# Patient Record
Sex: FEMALE | Race: WHITE | NOT HISPANIC OR LATINO | URBAN - METROPOLITAN AREA
[De-identification: names, ages, dates, MRNs, and addresses within clinical notes are randomized per-mention and may not be internally consistent; named-entity substitution may affect disease eponyms.]

---

## 2018-03-20 ENCOUNTER — OUTPATIENT (OUTPATIENT)
Dept: OUTPATIENT SERVICES | Facility: HOSPITAL | Age: 68
LOS: 1 days | End: 2018-03-20

## 2018-03-20 ENCOUNTER — APPOINTMENT (OUTPATIENT)
Dept: OPHTHALMOLOGY | Facility: CLINIC | Age: 68
End: 2018-03-20

## 2018-03-23 DIAGNOSIS — H18.609 KERATOCONUS, UNSPECIFIED, UNSPECIFIED EYE: ICD-10-CM

## 2018-03-23 PROBLEM — Z00.00 ENCOUNTER FOR PREVENTIVE HEALTH EXAMINATION: Noted: 2018-03-23

## 2018-03-27 ENCOUNTER — APPOINTMENT (OUTPATIENT)
Dept: SURGERY | Facility: CLINIC | Age: 68
End: 2018-03-27

## 2018-04-09 ENCOUNTER — OUTPATIENT (OUTPATIENT)
Dept: OUTPATIENT SERVICES | Facility: HOSPITAL | Age: 68
LOS: 1 days | Discharge: HOME | End: 2018-04-09

## 2018-05-14 ENCOUNTER — OUTPATIENT (OUTPATIENT)
Dept: OUTPATIENT SERVICES | Facility: HOSPITAL | Age: 68
LOS: 1 days | Discharge: HOME | End: 2018-05-14

## 2018-06-13 ENCOUNTER — RESULT REVIEW (OUTPATIENT)
Age: 68
End: 2018-06-13

## 2018-07-30 ENCOUNTER — APPOINTMENT (OUTPATIENT)
Dept: HEART AND VASCULAR | Facility: CLINIC | Age: 68
End: 2018-07-30

## 2020-11-19 ENCOUNTER — APPOINTMENT (OUTPATIENT)
Dept: ORTHOPEDIC SURGERY | Facility: CLINIC | Age: 70
End: 2020-11-19
Payer: MEDICARE

## 2020-11-19 VITALS — HEIGHT: 62 IN | BODY MASS INDEX: 23.92 KG/M2 | WEIGHT: 130 LBS

## 2020-11-19 DIAGNOSIS — M21.40 FLAT FOOT [PES PLANUS] (ACQUIRED), UNSPECIFIED FOOT: ICD-10-CM

## 2020-11-19 DIAGNOSIS — M25.879 OTHER SPECIFIED JOINT DISORDERS, UNSPECIFIED ANKLE AND FOOT: ICD-10-CM

## 2020-11-19 DIAGNOSIS — M17.10 UNILATERAL PRIMARY OSTEOARTHRITIS, UNSPECIFIED KNEE: ICD-10-CM

## 2020-11-19 PROCEDURE — 99204 OFFICE O/P NEW MOD 45 MIN: CPT

## 2020-11-19 PROCEDURE — 73562 X-RAY EXAM OF KNEE 3: CPT | Mod: LT

## 2020-11-19 PROCEDURE — 73610 X-RAY EXAM OF ANKLE: CPT | Mod: LT

## 2020-11-19 NOTE — HISTORY OF PRESENT ILLNESS
[de-identified] : Location: Left ankle, right knee\par Duration: August 2020 (ankle), 2 days (right knee)\par Context: atraumatic (ankle), felt knee buckle \par Quality: achy\par Aggravating factors: walking too much\par Associated symptoms: swelling (knee)\par Conservative treatment: rest, orthotics\par Prior studies: ankle x-rays from podiatrist August 2020

## 2020-11-19 NOTE — ASSESSMENT
[FreeTextEntry1] : Discussed at length with patient exam history and imaging.  Patient is artery wearing a reach orthotic which I feel is suitable for her condition in regards to her ankle. Discussed at length with patient definitive treatment for ankle pain would be a major hindfoot and midfoot reconstruction at this point the patient does not feel her symptoms warrant any type of surgical intervention.  Reviewed at length treatment options regards to her right knee and patient elects home exercises at this time.   Patient to follow up in regards to knee and persistent discomfort and if she like to proceed with surgery for her ankle I would refer her to a foot and ankle specialist who does this type of procedure

## 2020-11-19 NOTE — PHYSICAL EXAM
[de-identified] : Right knee\par \par Constitutional: \par The patient is healthy-appearing and in no apparent distress. \par \par Gait:\par The patient ambulates with a normal gait and no limp.\par \par Cardiovascular System: \par The capillary refill is less than 2 seconds. \par \par Skin: \par There are no skin abnormalities.\par \par Right Knee: \par \par Bony Palpation: \par There is no tenderness of the medial joint line. \par There is no tenderness of the lateral joint line.\par There is no tenderness of the medial femoral chondyle.\par There is no tenderness of the lateral femoral chondyle.\par There is no tenderness of the tibial tubercle.\par There is no tenderness of the superior patella.\par There is no tenderness of the inferior patella.\par There is tenderness of the medial patellar facet.\par There is tenderness of the lateral patellar facet.\par \par Soft Tissue Palpation: \par There is no tenderness of the medial retinaculum.\par There is no tenderness of the lateral retinaculum.\par There is no tenderness of the quadriceps tendon.\par There is no tenderness of the patella tendon.\par There is no tenderness of the ITB.\par There is no tenderness of the pes anserine.\par \par Active Range of Motion: \par The range of motion at the knee actively and passively is full. \par \par Special Tests: \par There is a negative Apley.\par There is a negative Steinmanns. \par There is a negative Lachman and Anterior Drawer.\par There is a negative Posterior Drawer.  \par There is no varus or valgus laxity.\par \par Strength: \par There is 4/5 hip flexion and 5/5 knee flexion and extension.  \par \par Psychiatric: \par The patient demonstrates a normal mood and affect and is active and alert,\par Left ankle:\par \par Cardiovascular System: \par Ther capillary refill is less than 2 seconds. \par \par Skin: \par There are no skin abnormalities of ankle.\par \par Ankles and Feet: \par Inspection: \par There is no erythema.\par There is no induration.\par There is no warmth.\par There is severe pes planus and rigid heel valgus with lateral positioning of the calcaneus.  \par There is mild swelling (anterolateral). \par \par Bony Palpation: \par There is no tenderness of the calcaneal tuberosity.\par There is no tenderness of the metatarsals.\par There is no tenderness of the tarsometatarsal joints\par There is no tenderness of the navicular tuberosity. \par There is no tenderness of the dome of talus.\par There is no tenderness of the head of talus.\par There is no tenderness of the inferior tibiofibular joint.\par \par Soft Tissue Palpation: \par There is no tenderness of the tibialis posterior.\par There is no tenderness of the tibialis anterior. \par There is no tenderness of the plantar fascia.\par There is no tenderness of the Achilles tendon.\par There is no tenderness of the extensor hallucis longus.\par There is no tenderness of the sinus tarsi. \par There is no tenderness of the peroneus longus and brevis.\par There is no tenderness of the deltoid ligament.   \par There is tenderness of the anterior talofibular ligament and the calcaneofibular ligament. \par \par Active Range of Motion: \par The range of motion at the ankle is full. \par \par Stability: \par The anterior drawer is negative. \par \par Strength: \par There is 5/5 ankle plantarflexion and dorsiflexion.\par \par Neurological System: \par There is normal sensation to light touch at the ankle and foot.  [de-identified] : X-ray left ankle.  There is severe pes planus with a talo-navicular dorsal arthritis with spurring.  \par \par X-ray right knee.  There is moderate patellofemoral arthritis

## 2021-01-21 ENCOUNTER — APPOINTMENT (OUTPATIENT)
Dept: ENDOCRINOLOGY | Facility: CLINIC | Age: 71
End: 2021-01-21
Payer: MEDICARE

## 2021-01-21 ENCOUNTER — LABORATORY RESULT (OUTPATIENT)
Age: 71
End: 2021-01-21

## 2021-01-21 VITALS
BODY MASS INDEX: 23.37 KG/M2 | SYSTOLIC BLOOD PRESSURE: 179 MMHG | WEIGHT: 127 LBS | DIASTOLIC BLOOD PRESSURE: 93 MMHG | HEIGHT: 62 IN | HEART RATE: 87 BPM

## 2021-01-21 DIAGNOSIS — E78.5 HYPERLIPIDEMIA, UNSPECIFIED: ICD-10-CM

## 2021-01-21 DIAGNOSIS — Z86.018 PERSONAL HISTORY OF OTHER BENIGN NEOPLASM: ICD-10-CM

## 2021-01-21 DIAGNOSIS — Z87.891 PERSONAL HISTORY OF NICOTINE DEPENDENCE: ICD-10-CM

## 2021-01-21 PROCEDURE — 99205 OFFICE O/P NEW HI 60 MIN: CPT | Mod: 25

## 2021-01-21 PROCEDURE — 36415 COLL VENOUS BLD VENIPUNCTURE: CPT

## 2021-01-21 RX ORDER — ROSUVASTATIN CALCIUM 5 MG/1
TABLET, FILM COATED ORAL
Refills: 0 | Status: ACTIVE | COMMUNITY

## 2021-01-21 RX ORDER — ALENDRONATE SODIUM 70 MG/1
70 TABLET ORAL
Refills: 0 | Status: ACTIVE | COMMUNITY

## 2021-01-21 RX ORDER — BUTALBITAL, ACETAMINOPHEN, AND CAFFEINE 50; 300; 40 MG/1; MG/1; MG/1
CAPSULE ORAL
Refills: 0 | Status: ACTIVE | COMMUNITY

## 2021-01-21 RX ORDER — CALCIUM CITRATE/VITAMIN D3 315MG-6.25
TABLET ORAL
Refills: 0 | Status: ACTIVE | COMMUNITY

## 2021-01-22 LAB
25(OH)D3 SERPL-MCNC: 51.2 NG/ML
ALBUMIN SERPL ELPH-MCNC: 4.4 G/DL
ALP BLD-CCNC: 53 U/L
ALT SERPL-CCNC: 10 U/L
ANION GAP SERPL CALC-SCNC: 12 MMOL/L
AST SERPL-CCNC: 14 U/L
BILIRUB SERPL-MCNC: 0.9 MG/DL
BUN SERPL-MCNC: 19 MG/DL
CALCIUM SERPL-MCNC: 9.1 MG/DL
CALCIUM SERPL-MCNC: 9.1 MG/DL
CHLORIDE SERPL-SCNC: 106 MMOL/L
CO2 SERPL-SCNC: 23 MMOL/L
CREAT SERPL-MCNC: 0.84 MG/DL
DHEA-S SERPL-MCNC: 19.2 UG/DL
GLUCOSE SERPL-MCNC: 85 MG/DL
IRON SATN MFR SERPL: 43 %
IRON SERPL-MCNC: 117 UG/DL
MAGNESIUM SERPL-MCNC: 2.3 MG/DL
PARATHYROID HORMONE INTACT: 44 PG/ML
PHOSPHATE SERPL-MCNC: 3.6 MG/DL
POTASSIUM SERPL-SCNC: 4.1 MMOL/L
PROT SERPL-MCNC: 6.7 G/DL
SODIUM SERPL-SCNC: 141 MMOL/L
T4 FREE SERPL-MCNC: 1.2 NG/DL
TESTOST SERPL-MCNC: 82.9 NG/DL
TIBC SERPL-MCNC: 272 UG/DL
TSH SERPL-ACNC: 1.85 UIU/ML
UIBC SERPL-MCNC: 155 UG/DL

## 2021-01-25 ENCOUNTER — OUTPATIENT (OUTPATIENT)
Dept: OUTPATIENT SERVICES | Facility: HOSPITAL | Age: 71
LOS: 1 days | End: 2021-01-25

## 2021-01-25 ENCOUNTER — APPOINTMENT (OUTPATIENT)
Dept: RADIOLOGY | Facility: CLINIC | Age: 71
End: 2021-01-25
Payer: MEDICARE

## 2021-01-25 LAB
TTG IGG SER IA-ACNC: 2.7 U/ML
TTG IGG SER IA-ACNC: NEGATIVE

## 2021-01-26 LAB
ALBUMIN MFR SERPL ELPH: 59.3 %
ALBUMIN SERPL-MCNC: 4.1 G/DL
ALBUMIN/GLOB SERPL: 1.5 RATIO
ALPHA1 GLOB MFR SERPL ELPH: 4.3 %
ALPHA1 GLOB SERPL ELPH-MCNC: 0.3 G/DL
ALPHA2 GLOB MFR SERPL ELPH: 8.9 %
ALPHA2 GLOB SERPL ELPH-MCNC: 0.6 G/DL
B-GLOBULIN MFR SERPL ELPH: 11.7 %
B-GLOBULIN SERPL ELPH-MCNC: 0.8 G/DL
GAMMA GLOB FLD ELPH-MCNC: 1.1 G/DL
GAMMA GLOB MFR SERPL ELPH: 15.8 %
INTERPRETATION SERPL IEP-IMP: NORMAL
PROT SERPL-MCNC: 6.9 G/DL
PROT SERPL-MCNC: 6.9 G/DL

## 2021-01-27 PROCEDURE — 77085 DXA BONE DENSITY AXL VRT FX: CPT | Mod: 26

## 2021-02-01 LAB
TTG IGA SER IA-ACNC: 1.2 U/ML
TTG IGA SER-ACNC: NEGATIVE

## 2021-02-17 LAB
ALBUMIN SERPL ELPH-MCNC: 4.7 G/DL
ALP BLD-CCNC: 57 U/L
ALT SERPL-CCNC: 9 U/L
ANION GAP SERPL CALC-SCNC: 22 MMOL/L
AST SERPL-CCNC: 16 U/L
BILIRUB SERPL-MCNC: 1.1 MG/DL
BUN SERPL-MCNC: 32 MG/DL
CALCIUM SERPL-MCNC: 9.5 MG/DL
CHLORIDE SERPL-SCNC: 102 MMOL/L
CO2 SERPL-SCNC: 14 MMOL/L
CREAT SERPL-MCNC: 1.07 MG/DL
GLUCOSE SERPL-MCNC: 83 MG/DL
IGF-1 INTERP: NORMAL
IGF-I BLD-MCNC: 77 NG/ML
MAGNESIUM SERPL-MCNC: 2.1 MG/DL
POTASSIUM SERPL-SCNC: 4.6 MMOL/L
PROT SERPL-MCNC: 7.4 G/DL
SODIUM SERPL-SCNC: 138 MMOL/L

## 2021-02-17 NOTE — ADDENDUM
[FreeTextEntry1] : Recent laboratory and radiology results as below; discussed with Ms. Cabrera. Calciotropic panel within range. Testosterone elevated but below 150 ng/dL. No hirsutism, voice changes, or other virilizing symptoms. Menstrual periods were regular prior to menopause. Bone density as below, within the osteopenic range at all sites. Other test results within range. We will discontinue alendronate for now, with monitoring of bone density in one year. Ms. Cabrera clarified she started alendronate in October 2019 and had a remote history of use 20 years ago. She is amenable to a trial of spironolactone for hair loss. We discussed the risks and benefits, including but not limited to hypotension, electrolyte disturbances. We will start spironolactone 50 mg twice daily for now, with plan to repeat a calciotropic panel in two weeks and check IGF-1 for elevated testosterone. We will plan for a follow-up appointment in six months. 2/01/21\par \par Recent laboratory results as below. Blood urea nitrogen and creatinine elevated consistent with dehydration. Potassium and magnesium within range. IGF-1 within range. I left a message for Ms. Cabrera to discuss. 2/1721

## 2021-02-17 NOTE — HISTORY OF PRESENT ILLNESS
[FreeTextEntry1] : Ms. Cabrera is a 70 year-old woman with a history of osteoporosis presenting to establish care with me for osteoporosis and hair loss.\par \par Bone History\par Menopause: Age 50\par Osteoporosis diagnosed in 2017 on routine bone density (no report available); most recent bone density as below\par Fracture history: Stress fracture in her left ankle in 2015\par Family history: Mother with history of osteoporosis; no parental history of hip fracture\par Treatment: Alendronate 70 mg weekly from 2019 to present (does not remember month started)\par \par Falls: None recent\par Height loss: About 2 inches\par Kidney stones: No\par Dental health: Regular appointments, no history of implants, no upcoming procedures planned\par Exercise: Walking at least 30 minutes most days\par Dairy intake: Rare\par Calcium supplements: Citracal 630 mg twice daily\par Multivitamin: None\par Vitamin D supplements: in calcium supplement\par \par Osteoporosis risk factors include: Postmenopausal status,  race, prior fracture, falls, height loss, small thin bones, tobacco use, excessive alcohol, anorexia, family history, vitamin D deficiency, corticosteroid use, seizure medications, malabsorption, hyperparathyroidism, hyperthyroidism.\par NEGATIVE EXCEPT: Postmenopausal status,  race, family history\par \par She has a history of hair loss that is bothersome to her. She has a history of headaches and burning in her left foot; discussed with her other treating providers.

## 2021-02-17 NOTE — ASSESSMENT
[FreeTextEntry1] : Osteoporosis. She has no history of fragility fracture. She has a history of alendronate therapy from 2019 to present. We discussed completion of a metabolic evaluation for secondary causes of bone loss. Her most recent bone density demonstrates osteopenia at the spine and hip sites. We discussed the potential benefits and risks of antiresorptive osteoporosis therapy at length, including but not limited to osteonecrosis of the jaw and atypical femoral fracture. She is tolerating alendronate and we can continue pending interval bone density testing; if her bone density remains in the osteopenia range we can consider a "drug holiday" from antiresorptive therapy.\par Metabolic evaluation for secondary causes of osteoporosis\par Continue alendronate 70 mg weekly pending interval bone density testing\par Calcium 1200 mg daily from diet and supplements (to be taken in divided doses as no more than 500-600 mg can be absorbed at one time); continue current regimen\par Continue current vitamin D regimen pending level\par Diet, exercise and fall prevention discussed\par Physical therapy for balance and bone health\par \par Hair loss. We will perform laboratory evaluation as below. We discussed that first line therapy for hair loss is topical minoxidil. We discussed the limited data for biotin, Nutrafol, and Viviscal supplements. \par \par I reviewed the DXA performed on June 18, 2019 with the patient today.\par I reviewed the laboratories performed in 2020 with the patient today. \par I counseled the patient regarding calcium and vitamin D intake today.\par I discussed the following osteoporosis therapies: Alendronate

## 2021-07-29 ENCOUNTER — APPOINTMENT (OUTPATIENT)
Dept: ENDOCRINOLOGY | Facility: CLINIC | Age: 71
End: 2021-07-29
Payer: MEDICARE

## 2021-07-29 VITALS
SYSTOLIC BLOOD PRESSURE: 120 MMHG | DIASTOLIC BLOOD PRESSURE: 72 MMHG | WEIGHT: 121 LBS | HEART RATE: 85 BPM | BODY MASS INDEX: 22.13 KG/M2

## 2021-07-29 DIAGNOSIS — M85.80 OTHER SPECIFIED DISORDERS OF BONE DENSITY AND STRUCTURE, UNSPECIFIED SITE: ICD-10-CM

## 2021-07-29 DIAGNOSIS — L65.9 NONSCARRING HAIR LOSS, UNSPECIFIED: ICD-10-CM

## 2021-07-29 DIAGNOSIS — R79.89 OTHER SPECIFIED ABNORMAL FINDINGS OF BLOOD CHEMISTRY: ICD-10-CM

## 2021-07-29 PROCEDURE — 99214 OFFICE O/P EST MOD 30 MIN: CPT

## 2021-07-30 NOTE — HISTORY OF PRESENT ILLNESS
[FreeTextEntry1] : Ms. Cabrera is a 70 year-old woman presenting for follow-up of osteopenia and hair loss. I saw her for an initial visit in January 2021.\par \par Bone History\par Menopause: Age 50\par Osteoporosis diagnosed in 2017 on routine bone density (no report available); most recent bone density as below\par Fracture history: Stress fracture in her left ankle in 2015\par Family history: Mother with history of osteoporosis; no parental history of hip fracture\par Treatment: Alendronate 70 mg weekly from October 2019 to February 2021; remote history \par \par Falls:Fall onto a boardwalk\par Height loss: About 2 inches\par Kidney stones: No\par Dental health: Regular appointments, no history of implants, no upcoming procedures planned\par Exercise: Walking at least 30 minutes most days\par Dairy intake: Rare\par Calcium supplements: Citracal 630 mg twice daily\par Multivitamin: None\par Vitamin D supplements: in calcium supplement\par \par Osteoporosis risk factors include: Postmenopausal status,  race, prior fracture, falls, height loss, small thin bones, tobacco use, excessive alcohol, anorexia, family history, vitamin D deficiency, corticosteroid use, seizure medications, malabsorption, hyperparathyroidism, hyperthyroidism.\par NEGATIVE EXCEPT: Postmenopausal status,  race, family history\par \par Hair loss. Elevated testosterone. \par She has a history of hair loss that is bothersome to her. \par Laboratory evaluation at her initial visit was significant for testosterone 82.9 ng/dL (normal: 2.9-40.8).\par We started spironolactone 50 mg twice daily in February 2021. \par \par Interim History \par Laboratory and radiology results from last visit as below. Calciotropic panel within range. Testosterone elevated but below 150 ng/dL. No hirsutism, voice changes, or other virilizing symptoms. Menstrual periods were regular prior to menopause. Other test results within range. \par Bone density as below, within the osteopenic range at all sites and with low FRAX score. We discontinued alendronate.  \par We started spironolactone 50 mg twice daily in February 2021. She is taking Nutrafol supplements. Potassium and magnesium within range. She has noted good improvement, although some continued hair loss. \par She has completed physical therapy for balance and bone health.\par Medical and surgical history, medications, allergies, social and family history reviewed and updated as needed.

## 2021-07-30 NOTE — PHYSICAL EXAM
[Alert] : alert [Healthy Appearance] : healthy appearance [No Acute Distress] : no acute distress [Normal Sclera/Conjunctiva] : normal sclera/conjunctiva [Normal Hearing] : hearing was normal [No Respiratory Distress] : no respiratory distress [No Stigmata of Cushings Syndrome] : no stigmata of Cushings Syndrome [Normal Gait] : normal gait [Normal Insight/Judgement] : insight and judgment were intact [Kyphosis] : no kyphosis present [Acanthosis Nigricans] : no acanthosis nigricans [de-identified] : no moon facies, no supraclavicular fat pads

## 2021-07-30 NOTE — DATA REVIEWED
[FreeTextEntry1] : Laboratories (November 4, 220) reviewed and significant for: \par Unremarkable complete blood count\par \par Laboratories (September 14, 2020) reviewed and significant for: \par Calcium 9.2 mg/dL (albumin 4.1 g/dL)\par BUN/creatinine 21/0.70 mg/dL \par Alkaline phosphatase 65 U/L\par \par Most recent bone mineral density\par Date: June 18, 2019\par Source: [facility]\par Site: [facility]\par \par Site	BMD (g/cm2)	T-score	Change previous	Change baseline	\par Lumbar spine	0.887	-2.4\par Femoral neck	0.797	-1.7	\par Total hip	                0.856       -1.2         \par Distal radius           	                \par DXA Comments: L4 excluded; left hip sites

## 2021-07-30 NOTE — ASSESSMENT
[FreeTextEntry1] : Osteopenia. She has no history of fragility fracture. She has a history of alendronate therapy from October 2019 to February 2021. Metabolic evaluation for secondary causes of bone loss previously unremarkable. Her most recent bone density demonstrated osteopenia at the spine and hip sites. Her 10 year fracture risk calculated by FRAX was 9.7% for major osteoporotic fracture and 1.5% for hip fracture, below the treatment thresholds.\par Interval bone density testing in January 2023\par Calcium 1200 mg daily from diet and supplements (to be taken in divided doses as no more than 500-600 mg can be absorbed at one time); continue current regimen\par Continue current vitamin D regimen\par Diet, exercise and fall prevention discussed\par \par Hair loss. She has a history of hair loss that is bothersome to her. Testosterone elevated but below 150 ng/dL. She has had some improvement with spironolactone and we will adjust as below. I advised she can continue Nutrafol supplements. \par Adjust spironolactone to 100 mg twice daily; monitor electrolytes and testosterone two weeks after dose adjustment\par \par Return to see me in 6 months. \par \par I reviewed the DXA performed on January 27, 2021 with the patient today.\par I reviewed the laboratories performed from 2020 to present with the patient today. \par I counseled the patient regarding calcium and vitamin D intake today.\par \par CC:\par Dr. Kyung Muro, Fax [phone]; patient results only test results be sent

## 2021-08-05 ENCOUNTER — RX RENEWAL (OUTPATIENT)
Age: 71
End: 2021-08-05

## 2021-08-05 RX ORDER — SPIRONOLACTONE 100 MG/1
100 TABLET ORAL TWICE DAILY
Qty: 180 | Refills: 1 | Status: ACTIVE | COMMUNITY
Start: 2021-02-01 | End: 1900-01-01

## 2021-10-04 ENCOUNTER — LABORATORY RESULT (OUTPATIENT)
Age: 71
End: 2021-10-04

## 2021-10-04 ENCOUNTER — APPOINTMENT (OUTPATIENT)
Dept: THORACIC SURGERY | Facility: CLINIC | Age: 71
End: 2021-10-04
Payer: MEDICARE

## 2021-10-04 DIAGNOSIS — R91.1 SOLITARY PULMONARY NODULE: ICD-10-CM

## 2021-10-04 PROCEDURE — 99203 OFFICE O/P NEW LOW 30 MIN: CPT

## 2021-10-04 NOTE — PHYSICAL EXAM
[General Appearance - Alert] : alert [General Appearance - In No Acute Distress] : in no acute distress [General Appearance - Well Nourished] : well nourished [Neck Appearance] : the appearance of the neck was normal [Neck Cervical Mass (___cm)] : no neck mass was observed [] : no respiratory distress [Respiration, Rhythm And Depth] : normal respiratory rhythm and effort [Exaggerated Use Of Accessory Muscles For Inspiration] : no accessory muscle use [Examination Of The Chest] : the chest was normal in appearance [Abnormal Walk] : normal gait [Nail Clubbing] : no clubbing  or cyanosis of the fingernails [Musculoskeletal - Swelling] : no joint swelling seen [Oriented To Time, Place, And Person] : oriented to person, place, and time [Impaired Insight] : insight and judgment were intact [Affect] : the affect was normal

## 2021-10-06 ENCOUNTER — APPOINTMENT (OUTPATIENT)
Dept: CT IMAGING | Facility: HOSPITAL | Age: 71
End: 2021-10-06

## 2021-10-06 ENCOUNTER — OUTPATIENT (OUTPATIENT)
Dept: OUTPATIENT SERVICES | Facility: HOSPITAL | Age: 71
LOS: 1 days | End: 2021-10-06
Payer: SELF-PAY

## 2021-10-06 PROCEDURE — 75571 CT HRT W/O DYE W/CA TEST: CPT

## 2021-10-06 PROCEDURE — 75571 CT HRT W/O DYE W/CA TEST: CPT | Mod: 26

## 2021-10-15 ENCOUNTER — OUTPATIENT (OUTPATIENT)
Dept: OUTPATIENT SERVICES | Facility: HOSPITAL | Age: 71
LOS: 1 days | End: 2021-10-15
Payer: MEDICARE

## 2021-10-15 ENCOUNTER — RESULT REVIEW (OUTPATIENT)
Age: 71
End: 2021-10-15

## 2021-10-15 ENCOUNTER — APPOINTMENT (OUTPATIENT)
Dept: CT IMAGING | Facility: HOSPITAL | Age: 71
End: 2021-10-15
Payer: MEDICARE

## 2021-10-15 PROCEDURE — 71260 CT THORAX DX C+: CPT

## 2021-10-15 PROCEDURE — 71260 CT THORAX DX C+: CPT | Mod: 26,MH

## 2021-10-15 NOTE — HISTORY OF PRESENT ILLNESS
[FreeTextEntry1] : 71 year old female, former smoker(quit 1982), with a PMHx GERD, HLD?, with recent CT chest revealing 2.2 cm partially calcified lesion in the anterior mediastinum and a stable 4 mm right upper lobe ground glass nodule. She presents today for an initial evaluation. She is referred by Dr. 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

## 2021-10-15 NOTE — ASSESSMENT
[FreeTextEntry1] : 71 year old female, former smoker(quit 1982), with a PMHx GERD, HLD?, with recent CT chest revealing 2.2 cm partially calcified lesion in the anterior mediastinum and a stable 4 mm right upper lobe ground glass nodule. She presents today for an initial evaluation. She is referred by Dr. Son Puga.\par \par Today the patient reports feeling well. She denies pain, fever, hemoptysis and weight loss. 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 I don’t recommend biopsy of this area because it is a high risk for seeding. Will order thymoma panel for further evaluation. If all results are negative, we can be almost certain that this growth is related to the thymic gland. The treatment for a thymoma is resection. Explained that thymomas are slow growing and the only way to determine a definitive diagnose is to remove it. Will have her RTC after above to discuss surveillance vs. resection.\par \par PLAN:\par 1. Thymoma panel\par 2. CT chest with contrast\par 3. RTC\par I reviewed the documentation recorded by the scribe in my presence and it accurately and completely records my words and actions\par \par

## 2021-10-15 NOTE — END OF VISIT
[FreeTextEntry3] : I, GERMAIN MOSQUEDA , am scribing for and in the presence of TAE BARKER the following sections: history of present illness, past medical/family/surgical/family/social history, review of systems, vital signs, physical exam, and disposition.\par \par

## 2021-10-21 ENCOUNTER — APPOINTMENT (OUTPATIENT)
Dept: THORACIC SURGERY | Facility: CLINIC | Age: 71
End: 2021-10-21
Payer: MEDICARE

## 2021-10-21 VITALS
WEIGHT: 125 LBS | BODY MASS INDEX: 23 KG/M2 | HEART RATE: 72 BPM | RESPIRATION RATE: 17 BRPM | HEIGHT: 62 IN | TEMPERATURE: 96.8 F | OXYGEN SATURATION: 95 % | SYSTOLIC BLOOD PRESSURE: 125 MMHG | DIASTOLIC BLOOD PRESSURE: 65 MMHG

## 2021-10-21 PROCEDURE — 99213 OFFICE O/P EST LOW 20 MIN: CPT

## 2021-10-26 NOTE — ASSESSMENT
[FreeTextEntry1] : 71 year old female, former smoker(quit 1982), with a PMHx GERD, HLD, with recent CT chest revealing 2.2 cm partially calcified lesion in the anterior mediastinum and a stable 4 mm right upper lobe ground glass nodule. She presents today to review CT chest. She is referred by Dr. Son Puga.\par \par CT chest completed on 10/15/21 was reviewed and the anterior mediastinal mass was assessed with differential diagnosis including a calcified lymph node, thymic cyst or neoplasm such as thymoma. I explained that the anterior mediastinal mass blood work was negative for evidence of lymphoma or a germ cell tumor. I am recommending further evaluation with an MRI chest to characterize the abnormality. If the MRI is not definitive in determining a diagnosis then surgical resection will be recommended. I explained that if this an early thymoma then surgical resection is the recommendation. \par \par Will plan to have the patient RTC after the MRI chest to discuss the results and possible surgical resection. \par \par Plan:\par 1. MRI chest with and without contrast then RTC \par \par I, NOEMI SAUL , am scribing for and in the presence of [Dr. Travon Saucedo] the following sections: History of present illness, past Medical/family/surgical/family/social history, review of systems, vital signs, physical exam and disposition.\par \par Travon SHERMAN, have reviewed the documentation recorded by the scribe in my presence and it accurately and completely records my words and actions.\par

## 2021-10-26 NOTE — HISTORY OF PRESENT ILLNESS
[FreeTextEntry1] : 71 year old female, former smoker(quit 1982), with a PMHx GERD, HLD?, with recent CT chest revealing 2.2 cm partially calcified lesion in the anterior mediastinum and a stable 4 mm right upper lobe ground glass nodule. She presents today to review CT chest. She is referred by Dr. 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  Heterogeneously calcified anterior mediastinal mass, indeterminate and differential diagnosis include calcified lymph node, thymic cyst, neoplasm. Correlation with MRI may be of benefit to better evaluate enhancement.\par 2.  Scarring in the right upper lobe. Scattered solid nodules measuring up to 5 mm, no routine follow-up is required.\par

## 2021-11-01 ENCOUNTER — APPOINTMENT (OUTPATIENT)
Dept: MRI IMAGING | Facility: HOSPITAL | Age: 71
End: 2021-11-01
Payer: MEDICARE

## 2021-11-01 ENCOUNTER — OUTPATIENT (OUTPATIENT)
Dept: OUTPATIENT SERVICES | Facility: HOSPITAL | Age: 71
LOS: 1 days | End: 2021-11-01
Payer: MEDICARE

## 2021-11-01 ENCOUNTER — RESULT REVIEW (OUTPATIENT)
Age: 71
End: 2021-11-01

## 2021-11-01 PROCEDURE — 71552 MRI CHEST W/O & W/DYE: CPT | Mod: MG

## 2021-11-01 PROCEDURE — G1004: CPT

## 2021-11-01 PROCEDURE — A9585: CPT

## 2021-11-01 PROCEDURE — 71552 MRI CHEST W/O & W/DYE: CPT | Mod: 26,MG

## 2021-11-04 ENCOUNTER — APPOINTMENT (OUTPATIENT)
Dept: THORACIC SURGERY | Facility: CLINIC | Age: 71
End: 2021-11-04
Payer: MEDICARE

## 2021-11-04 DIAGNOSIS — D49.89 NEOPLASM OF UNSPECIFIED BEHAVIOR OF OTHER SPECIFIED SITES: ICD-10-CM

## 2021-11-04 PROCEDURE — 99213 OFFICE O/P EST LOW 20 MIN: CPT

## 2021-12-13 ENCOUNTER — NON-APPOINTMENT (OUTPATIENT)
Age: 71
End: 2021-12-13

## 2021-12-13 NOTE — ASSESSMENT
[FreeTextEntry1] : 71 year old female, former smoker(quit 1982), with a PMHx GERD, HLD?, with recent CT chest revealing 2.2 cm partially calcified lesion in the anterior mediastinum and a stable 4 mm right upper lobe ground glass nodule. She presents today to review MRI chest. She is referred by Dr. Son Puga.\par \par She admits to a recent fall with injury to her sternum and is also s/p laminectomy approximately 2 years ago. \par \par MRI chest completed on 11/1/21: was reviewed which revealed an Enhancing mass in the anterior mediastinal probably partially calcified. Differential diagnosis would include thymoma, treated Hodgkin's disease. Abnormal body of sternum as described above as well as apparent 2 cm long T2 bright signal involving the mid thoracic spinal cord probably at T5-T6 level-differential diagnosis would include myelitis/myelopathy.\par \par Discussed that the MRI did not give us a definitive diagnosis and resection is recommended. Explained that the findings on the MRI in the thoracic spine and sternum must be addressed prior to performing an intervention on the anterior mediastinal mass. PAtient reports injury of sternum with resolution of pain. She also admits to undergoing a laminectomy and has plans to undergo an MRi of her thoracic spine (11/17/21) and has a follow up appointment with her neurosurgeon, Dr. La at [facility] [place] (11/23/21). If the imaging is normal and there are no concerns then I recommend scheduling her for resection of anterior mediastinal mass. Explained that resection is curative. She should follow up after appointments with her neurosurgeon to schedule Bilateral VATS robotic assisted resection of anterior mediastinal mass. Patient requested that our office does not call her neurosurgeon for imaging or a consult note and she would like to obtain all of that information herself. \par \par PLAN:\par 1. Follow up with neurosurgeon/ MRi of thoracic spine at [facility] [place]\par 2. Schedule Bilateral VATS robotic assisted resection of anterior mediastinal mass\par \par

## 2021-12-13 NOTE — HISTORY OF PRESENT ILLNESS
[FreeTextEntry1] : 71 year old female, former smoker(quit 1982), with a PMHx GERD, HLD, with recent CT chest revealing 2.2 cm partially calcified lesion in the anterior mediastinum and a stable 4 mm right upper lobe ground glass nodule. She presents today to review MRI chest. She is referred by Dr. 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 Heterogeneously calcified anterior mediastinal mass, indeterminate and differential diagnosis include calcified lymph node, thymic cyst, neoplasm. Correlation with MRI may be of benefit to better evaluate enhancement.\par 2. Scarring in the right upper lobe. Scattered solid nodules measuring up to 5 mm, no routine follow-up is required.\par \par MRI chest completed on 11/1/21:\par - Enhancing mass in the anterior mediastinal probably partially calcified. Differential diagnosis would include thymoma, treated Hodgkin's disease.\par Abnormal body of sternum as described above.\par \par

## 2021-12-20 ENCOUNTER — NON-APPOINTMENT (OUTPATIENT)
Age: 71
End: 2021-12-20

## 2022-06-12 NOTE — DATA REVIEWED
[de-identified] : I have reviewed the most recent MRI which shows a nonenhancing 1cm lesion within the left [place]

## 2022-06-12 NOTE — HISTORY OF PRESENT ILLNESS
[de-identified] : 71 year old female with history of anterior mediastinal mass s/p biopsy 3/18/19  presents with nonehancing brain lesion on recent MRI. Biopsy of mediastinal mass revealed fibroelastic scar with scant lung parenchyma and fibrin\par - no carcinoma\par - possible necrotizing granuloma.\par \par

## 2022-06-12 NOTE — ASSESSMENT
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   The patient’s established problem of  is.  The differential diagnosis is.  Her KPS is . I had a long discussion with the patient regarding the role of continued observation with serial imaging.  The patient was extensively educated about the nature of her disease process. Therapeutic and diagnostic tests include.  The patient should see.  I will see the patient back in. I have explained the alternatives, risks and benefits to the patient and she understands and agrees to proceed.  This risk of the procedure including but not limited to pain, infection, seizure, stroke, recurrence, residual disease, neurovascular injury, heart attack, pulmonary embolism, blindness, weakness, paralysis and death have been carefully explained to the patient who clearly understands and agrees to proceed.\par

## 2022-06-13 ENCOUNTER — APPOINTMENT (OUTPATIENT)
Dept: NEUROSURGERY | Facility: CLINIC | Age: 72
End: 2022-06-13

## 2023-01-19 ENCOUNTER — OFFICE (OUTPATIENT)
Dept: URBAN - METROPOLITAN AREA CLINIC 28 | Facility: CLINIC | Age: 73
Setting detail: OPHTHALMOLOGY
End: 2023-01-19
Payer: MEDICARE

## 2023-01-19 DIAGNOSIS — H02.89: ICD-10-CM

## 2023-01-19 DIAGNOSIS — Z96.1: ICD-10-CM

## 2023-01-19 PROCEDURE — 92012 INTRM OPH EXAM EST PATIENT: CPT | Performed by: OPHTHALMOLOGY

## 2023-01-19 ASSESSMENT — KERATOMETRY
OS_K2POWER_DIOPTERS: 46.00
OD_K1POWER_DIOPTERS: 45.00
OD_AXISANGLE_DEGREES: 097
OS_K1POWER_DIOPTERS: 45.25
OS_AXISANGLE_DEGREES: 086
OD_K2POWER_DIOPTERS: 46.50

## 2023-01-19 ASSESSMENT — CONFRONTATIONAL VISUAL FIELD TEST (CVF)
OD_FINDINGS: FULL
OS_FINDINGS: FULL

## 2023-01-19 ASSESSMENT — AXIALLENGTH_DERIVED
OS_AL: 23.6884
OD_AL: 23.3523

## 2023-01-19 ASSESSMENT — VISUAL ACUITY
OD_BCVA: 20/30
OS_BCVA: 20/20-1

## 2023-01-19 ASSESSMENT — LID EXAM ASSESSMENTS
OD_MEIBOMITIS: 1+ 2+
OS_MEIBOMITIS: 1+ 2+

## 2023-01-19 ASSESSMENT — SPHEQUIV_DERIVED
OD_SPHEQUIV: -1.5
OS_SPHEQUIV: -2.25

## 2023-01-19 ASSESSMENT — REFRACTION_AUTOREFRACTION
OD_CYLINDER: -0.50
OS_CYLINDER: -1.00
OD_AXIS: 152
OS_SPHERE: -1.75
OD_SPHERE: -1.25
OS_AXIS: 054

## 2023-03-20 ENCOUNTER — APPOINTMENT (OUTPATIENT)
Dept: RADIOLOGY | Facility: HOSPITAL | Age: 73
End: 2023-03-20

## 2023-03-21 ENCOUNTER — TRANSCRIPTION ENCOUNTER (OUTPATIENT)
Age: 73
End: 2023-03-21

## 2023-03-21 ENCOUNTER — APPOINTMENT (OUTPATIENT)
Dept: RADIOLOGY | Facility: CLINIC | Age: 73
End: 2023-03-21
Payer: MEDICARE

## 2023-03-21 ENCOUNTER — OUTPATIENT (OUTPATIENT)
Dept: OUTPATIENT SERVICES | Facility: HOSPITAL | Age: 73
LOS: 1 days | End: 2023-03-21

## 2023-03-21 PROCEDURE — 77080 DXA BONE DENSITY AXIAL: CPT | Mod: 26

## 2025-03-25 ENCOUNTER — APPOINTMENT (OUTPATIENT)
Dept: RADIOLOGY | Facility: CLINIC | Age: 75
End: 2025-03-25
Payer: MEDICARE

## 2025-03-25 PROCEDURE — 77080 DXA BONE DENSITY AXIAL: CPT
